# Patient Record
Sex: MALE | URBAN - METROPOLITAN AREA
[De-identification: names, ages, dates, MRNs, and addresses within clinical notes are randomized per-mention and may not be internally consistent; named-entity substitution may affect disease eponyms.]

---

## 2022-06-16 ENCOUNTER — APPOINTMENT (RX ONLY)
Dept: URBAN - METROPOLITAN AREA CLINIC 152 | Facility: CLINIC | Age: 52
Setting detail: DERMATOLOGY
End: 2022-06-16

## 2022-06-16 DIAGNOSIS — L30.8 OTHER SPECIFIED DERMATITIS: ICD-10-CM

## 2022-06-16 PROCEDURE — ? PRESCRIPTION MEDICATION MANAGEMENT

## 2022-06-16 PROCEDURE — 99204 OFFICE O/P NEW MOD 45 MIN: CPT

## 2022-06-16 PROCEDURE — ? PRESCRIPTION

## 2022-06-16 PROCEDURE — ? COUNSELING

## 2022-06-16 RX ORDER — BETAMETHASONE DIPROPIONATE 0.5 MG/G
OINTMENT TOPICAL
Qty: 45 | Refills: 8 | Status: ERX | COMMUNITY
Start: 2022-06-16

## 2022-06-16 RX ADMIN — BETAMETHASONE DIPROPIONATE: 0.5 OINTMENT TOPICAL at 00:00

## 2022-06-16 ASSESSMENT — LOCATION ZONE DERM
LOCATION ZONE: FINGER
LOCATION ZONE: HAND

## 2022-06-16 ASSESSMENT — LOCATION DETAILED DESCRIPTION DERM
LOCATION DETAILED: RIGHT PROXIMAL DORSAL RING FINGER
LOCATION DETAILED: RIGHT ULNAR DORSAL HAND
LOCATION DETAILED: RIGHT PROXIMAL DORSAL MIDDLE FINGER

## 2022-06-16 ASSESSMENT — LOCATION SIMPLE DESCRIPTION DERM
LOCATION SIMPLE: RIGHT RING FINGER
LOCATION SIMPLE: RIGHT HAND
LOCATION SIMPLE: RIGHT MIDDLE FINGER

## 2022-06-16 NOTE — HPI: OTHER
Condition:: Eczema
Please Describe Your Condition:: Patient is experiencing eczema flares that comes and goes on his hands due to stress. He was previously prescribed ketoconazole cream by his PCP. We previously prescribed desoximetasone 0.05% by us years ago. He stated the ketoconazole worked better than desoximetasone. He used the desoximetasone once nightly and used a glove to cover his hand. He uses SkinFix eczema cream on his hands

## 2022-06-16 NOTE — PROCEDURE: COUNSELING
Patient Specific Counseling (Will Not Stick From Patient To Patient): -Discussed to follow up with Dr. Anderson to do a more thorough allergy test for allergic contact dermatitis \\n-Discussed starting Betamethasone dipropionate 0.05 % topical ointment to affected areas of the hands twice a day for 5 days. Then once nightly for 7 days. Then MWF for 1-2 weeks\\n-Discussed daily hand moisturizer (SkinFix eczema cream) \\n-Discussed to continue regimen of applying steroid ointment on hands and then a glove on top of that
Detail Level: Detailed

## 2022-06-16 NOTE — PROCEDURE: PRESCRIPTION MEDICATION MANAGEMENT
Samples Given: SkinFix eczema repair hand cream
Detail Level: Zone
Render In Strict Bullet Format?: No
Initiate Treatment: Betamethasone dispropionate 0.05% ointment affected areas of the hands twice a day for 5 days. Then once nightly for 7 days. Then MWF for 1-2 weeks.